# Patient Record
(demographics unavailable — no encounter records)

---

## 2024-11-27 NOTE — CONSULT LETTER
[Dear  ___] : Dear  [unfilled], [Consult Letter:] : I had the pleasure of evaluating your patient, [unfilled]. [Please see my note below.] : Please see my note below. [Sincerely,] : Sincerely, [FreeTextEntry2] : Carol Littlejohn MD [address], [facility] [address],  [place] [address] [FreeTextEntry3] : Tc Cabrera MD

## 2024-11-27 NOTE — HISTORY OF PRESENT ILLNESS
[FreeTextEntry1] : Pt is  LMP age 53 presents as new pt for consultation for postmen bleeding and blood in endometrium on imaging and subsequent unsuccessful SHG due to cervical stenosis. pt reports vag spotting after intercourse otherwise no VB since age 53. vag spotting started this year.  	 EXAM: [id number] - US SONOHYSTEROGRAPHY#  - ORDERED BY: LB ULLOA   PROCEDURE DATE:  2024    INTERPRETATION:  SONOHYSTEROGRAM  CLINICAL INFORMATION: Rule out endometrial polyp. Thickened endometrial lining.  LMP: Postmenopausal  COMPARISON: None  TECHNIQUE:  Attempts of placing a catheter into the cervical os were performed. These attempts were unsuccessful. A limited endovaginal ultrasound examination of the pelvis was then performed.  FINDINGS:  Uterus: There are multiple small fibroids measuring up to 1.1 x 1.1 x 1.6 cm in size. There is fluid in the cervical canal with a questionable polyp measuring 7 x 6 x 6 mm. The endometrium measures 5 mm in thickness and contains 1 mm of fluid. No endometrial polyp is seen.  IMPRESSION:  Unsuccessful sonohysterogram. Endometrial lining is borderline thickened and contains 1 mm of fluid. No endometrial polyp is seen. There is fluid in the cervical canal with a probable cervical polyp. Direct inspection is suggested. Small uterine fibroids.    --- End of Report ---       ALEX BRAVO MD; Attending Radiologist This document has been electronically signed. Sep  5 2024  2:03PM  MRI 24: ut 8.2x4.6x6cm, with multiple fibroids largest IM 1.8cm EMS4mm;  hemorrhagic material in the endometrial and endocervical cavity. 1cm right ovarian calcification L ov wnl; no lymphadneopathy  OB: TOP/D+C;  1987 GYN: atrophic vaginitis; hx abnml pap 24 ascus HPV HR pos (neg 16/18) colpo c/w CIN1; fibroid PMH: HTN, MARIBEL PSHX:  FH: mother  9mos old SH:neg x3 meds: lisinopril, vaginal estradiol allergies:sulfa

## 2024-11-27 NOTE — PHYSICAL EXAM
[Appropriately responsive] : appropriately responsive [Alert] : alert [Soft] : soft [Non-tender] : non-tender [Non-distended] : non-distended [Oriented x3] : oriented x3 [Labia Majora] : normal [Labia Minora] : normal [Normal] : normal [Uterine Adnexae] : normal

## 2024-11-27 NOTE — PLAN
[FreeTextEntry1] : 64yo  postmen bleeding; sx endometrial vs endocervical polyp  Discussed the surgical management of endometrial polyps at length with patient.  Risks/benefits and alternatives of operative hysteroscopy and polyp resection were reviewed including but not limited to risk of infection, hemorrhage and perforation with concomittant risks of injury to abdominal organs (bladder, bowel, gyn and vascular structures).  Remote risk of fluid overload reported.   Discussed the need to determine pathology of polyp to r/o malignant potential. -book hysteroscopic polypectomy -erx cytotec for night before due to cervical stenosis -medical clearance  During this visit 40 minutes were spent face-to-face with greater than 50% of the time dedicated to counseling.

## 2025-01-31 NOTE — PLAN
[FreeTextEntry1] : 65 year old presents for a post-op visit, s/p Dilation and curettage, hysteroscopic polypectomy and myomectomy on 01/14/25. Stable.  path benign  Routine post op care f/u with Dr. Rose for routine GYN care

## 2025-01-31 NOTE — ASSESSMENT
[Doing Well] : is doing well [Excellent Pain Control] : has excellent pain control [No Sign of Infection] : is showing no signs of infection [de-identified] : 65 year old presents for a post-op visit, s/p Dilation and curettage, hysteroscopic polypectomy and myomectomy on 01/14/25. Stable.

## 2025-01-31 NOTE — END OF VISIT
[Time Spent: ___ minutes] : I have spent [unfilled] minutes of time on the encounter which excludes teaching and separately reported services. [FreeTextEntry3] :  I, Sapna Allred, acted as a scribe on behalf of Dr. Tc Cabrera M.D. on 01/31/2025.   All medical entries made by the scribe were at my Dr. Tc Cabrera M.D direction and personally dictated by me on 01/31/2025. I have reviewed the chart and agree that the record accurately reflects my personal performance of the history, physical exam, assessment and plan. I have also personally directed, reviewed, and agreed with the chart.

## 2025-01-31 NOTE — HISTORY OF PRESENT ILLNESS
[Pain is well-controlled] : pain is well-controlled [Clean/Dry/Intact] : clean, dry and intact [None] : no vaginal bleeding [Normal] : normal [Pathology reviewed] : pathology reviewed [Fever] : no fever [Chills] : no chills [Nausea] : no nausea [Vomiting] : no vomiting [Erythema] : not erythematous [de-identified] : 01/14/25 [de-identified] :  Dilation and curettage, hysteroscopic polypectomy and myomectomy [de-identified] :  65 year old presents for a post-op visit, s/p  Dilation and curettage, hysteroscopic polypectomy and myomectomy on 01/14/25. She was discharged on POD 0. She is doing well and has no complaints.  Pathology: 1.  Endometrial and endocervical polyp. -   Fragments of endometrial polyp -   Fragments of endocervical polyp  -   Strips of atrophic endometrium  2.  Endometrial curettings -   Benign endocervical and squamous mucosa.  No definitive endometrial tissue is present  Operative findings: Endocervical polyp and uterine fibroids.    After informed consent was obtained, the patient was taken to the operating room where anesthesia was induced and found to be adequate.  The patient was placed in dorsal lithotomy position and prepped and draped in the sterile fashion.  Exam under anesthesia  revealed an anteverted uterus.  Adnexa nonpalpable bilaterally.  A Mullen retractor was placed in the posterior fornix of the vagina and the anterior lip of the cervix was grasped with a Hernandez tenaculum.  An attempt to dilate the cervix with the cervix  os binder demonstrated cervical stenosis, thus an 11 blade was used to incise a small cone in the cervix to assist in cervical dilation.  The cervical os binder was then used and then the cervix was gently dilated to 5 mm through serial insertion of Hegar  dilators.  A 4.6 mm operative hysteroscope was inserted into the uterus to the fundus under infusion of normal saline.  The cavity was visualized and the ostia were located and within normal limits bilaterally.  An endocervical polyp was also noted at  that time.  The uterus was noted to be within normal limits.  The Aveta system resectoscope was then inserted into the hysteroscope in order to remove the endometrial polyp and the uterine fibroid that was seen on the posterior uterus.  After multiple  passes with the Aveta system resectoscope, the polyp and the fibroids were removed.  Sharp curettage was performed with a Mullen curette.  Curettings were placed on a Telfa pad to be sent to pathology. Hysteroscope reintroduced with no complications noted.  The tenaculum was removed with good hemostasis noted.  The patient tolerated the procedure well and was taken to the recovery room in stable condition.  Sponge count was correct x2.  After incision of cervix, the patient received 2 g Ancef due to pruritic  fluid being released from cervix after incision.  Pathology, endometrial curettings and polyp and uterine fibroid were sent. [de-identified] : abd soft, nt, nd

## 2025-01-31 NOTE — CONSULT LETTER
[Dear  ___] : Dear  [unfilled], [Please see my note below.] : Please see my note below. [Consult Closing:] : Thank you very much for allowing me to participate in the care of this patient.  If you have any questions, please do not hesitate to contact me. [Sincerely,] : Sincerely, [FreeTextEntry2] : Carol Littlejohn MD [address], [facility] [address], [place] [address] [FreeTextEntry1] : Thank you for referring your patient  KATHARINE POLANCO to my office for consultation and treatment. Outlined below is her surgical treatment and followup. [FreeTextEntry3] : Tc Cabrera MD